# Patient Record
Sex: MALE | Race: OTHER | HISPANIC OR LATINO | URBAN - METROPOLITAN AREA
[De-identification: names, ages, dates, MRNs, and addresses within clinical notes are randomized per-mention and may not be internally consistent; named-entity substitution may affect disease eponyms.]

---

## 2021-12-23 ENCOUNTER — EMERGENCY (EMERGENCY)
Facility: HOSPITAL | Age: 71
LOS: 1 days | Discharge: HOME | End: 2021-12-23
Attending: EMERGENCY MEDICINE | Admitting: EMERGENCY MEDICINE
Payer: SELF-PAY

## 2021-12-23 VITALS
SYSTOLIC BLOOD PRESSURE: 202 MMHG | OXYGEN SATURATION: 97 % | DIASTOLIC BLOOD PRESSURE: 102 MMHG | TEMPERATURE: 98 F | HEART RATE: 84 BPM | RESPIRATION RATE: 16 BRPM

## 2021-12-23 VITALS
DIASTOLIC BLOOD PRESSURE: 85 MMHG | HEIGHT: 68 IN | SYSTOLIC BLOOD PRESSURE: 168 MMHG | HEART RATE: 107 BPM | RESPIRATION RATE: 12 BRPM | OXYGEN SATURATION: 94 % | WEIGHT: 110.01 LBS | TEMPERATURE: 96 F

## 2021-12-23 DIAGNOSIS — F19.19 OTHER PSYCHOACTIVE SUBSTANCE ABUSE WITH UNSPECIFIED PSYCHOACTIVE SUBSTANCE-INDUCED DISORDER: ICD-10-CM

## 2021-12-23 DIAGNOSIS — F17.200 NICOTINE DEPENDENCE, UNSPECIFIED, UNCOMPLICATED: ICD-10-CM

## 2021-12-23 LAB
AMPHET UR-MCNC: NEGATIVE — SIGNIFICANT CHANGE UP
ANION GAP SERPL CALC-SCNC: 13 MMOL/L — SIGNIFICANT CHANGE UP (ref 5–17)
APPEARANCE UR: CLEAR — SIGNIFICANT CHANGE UP
BARBITURATES UR SCN-MCNC: NEGATIVE — SIGNIFICANT CHANGE UP
BASE EXCESS BLDV CALC-SCNC: -2.5 MMOL/L — LOW (ref -2–3)
BASE EXCESS BLDV CALC-SCNC: -4.3 MMOL/L — LOW (ref -2–3)
BASOPHILS # BLD AUTO: 0.04 K/UL — SIGNIFICANT CHANGE UP (ref 0–0.2)
BASOPHILS NFR BLD AUTO: 0.3 % — SIGNIFICANT CHANGE UP (ref 0–2)
BENZODIAZ UR-MCNC: NEGATIVE — SIGNIFICANT CHANGE UP
BILIRUB UR-MCNC: NEGATIVE — SIGNIFICANT CHANGE UP
BUN SERPL-MCNC: 19 MG/DL — SIGNIFICANT CHANGE UP (ref 7–23)
CA-I SERPL-SCNC: 1.12 MMOL/L — LOW (ref 1.15–1.33)
CA-I SERPL-SCNC: 1.14 MMOL/L — LOW (ref 1.15–1.33)
CALCIUM SERPL-MCNC: 8.3 MG/DL — LOW (ref 8.4–10.5)
CHLORIDE SERPL-SCNC: 110 MMOL/L — HIGH (ref 96–108)
CO2 BLDV-SCNC: 27 MMOL/L — HIGH (ref 22–26)
CO2 BLDV-SCNC: 27.2 MMOL/L — HIGH (ref 22–26)
CO2 SERPL-SCNC: 23 MMOL/L — SIGNIFICANT CHANGE UP (ref 22–31)
COCAINE METAB.OTHER UR-MCNC: NEGATIVE — SIGNIFICANT CHANGE UP
COLOR SPEC: YELLOW — SIGNIFICANT CHANGE UP
CREAT SERPL-MCNC: 0.87 MG/DL — SIGNIFICANT CHANGE UP (ref 0.5–1.3)
DIFF PNL FLD: ABNORMAL
EOSINOPHIL # BLD AUTO: 0.01 K/UL — SIGNIFICANT CHANGE UP (ref 0–0.5)
EOSINOPHIL NFR BLD AUTO: 0.1 % — SIGNIFICANT CHANGE UP (ref 0–6)
EPI CELLS # UR: SIGNIFICANT CHANGE UP /HPF (ref 0–5)
ETHANOL SERPL-MCNC: 106 MG/DL — HIGH (ref 0–10)
GAS PNL BLDV: 143 MMOL/L — SIGNIFICANT CHANGE UP (ref 136–145)
GAS PNL BLDV: 144 MMOL/L — SIGNIFICANT CHANGE UP (ref 136–145)
GAS PNL BLDV: SIGNIFICANT CHANGE UP
GLUCOSE SERPL-MCNC: 115 MG/DL — HIGH (ref 70–99)
GLUCOSE UR QL: NEGATIVE — SIGNIFICANT CHANGE UP
HCO3 BLDV-SCNC: 25 MMOL/L — SIGNIFICANT CHANGE UP (ref 22–29)
HCO3 BLDV-SCNC: 25 MMOL/L — SIGNIFICANT CHANGE UP (ref 22–29)
HCT VFR BLD CALC: 41.5 % — SIGNIFICANT CHANGE UP (ref 39–50)
HGB BLD-MCNC: 13.5 G/DL — SIGNIFICANT CHANGE UP (ref 13–17)
IMM GRANULOCYTES NFR BLD AUTO: 0.5 % — SIGNIFICANT CHANGE UP (ref 0–1.5)
KETONES UR-MCNC: NEGATIVE — SIGNIFICANT CHANGE UP
LEUKOCYTE ESTERASE UR-ACNC: NEGATIVE — SIGNIFICANT CHANGE UP
LYMPHOCYTES # BLD AUTO: 1.13 K/UL — SIGNIFICANT CHANGE UP (ref 1–3.3)
LYMPHOCYTES # BLD AUTO: 9.7 % — LOW (ref 13–44)
MAGNESIUM SERPL-MCNC: 1.9 MG/DL — SIGNIFICANT CHANGE UP (ref 1.6–2.6)
MCHC RBC-ENTMCNC: 32.5 GM/DL — SIGNIFICANT CHANGE UP (ref 32–36)
MCHC RBC-ENTMCNC: 32.9 PG — SIGNIFICANT CHANGE UP (ref 27–34)
MCV RBC AUTO: 101.2 FL — HIGH (ref 80–100)
METHADONE UR-MCNC: NEGATIVE — SIGNIFICANT CHANGE UP
MONOCYTES # BLD AUTO: 0.82 K/UL — SIGNIFICANT CHANGE UP (ref 0–0.9)
MONOCYTES NFR BLD AUTO: 7 % — SIGNIFICANT CHANGE UP (ref 2–14)
NEUTROPHILS # BLD AUTO: 9.59 K/UL — HIGH (ref 1.8–7.4)
NEUTROPHILS NFR BLD AUTO: 82.4 % — HIGH (ref 43–77)
NITRITE UR-MCNC: NEGATIVE — SIGNIFICANT CHANGE UP
NRBC # BLD: 0 /100 WBCS — SIGNIFICANT CHANGE UP (ref 0–0)
OPIATES UR-MCNC: NEGATIVE — SIGNIFICANT CHANGE UP
PCO2 BLDV: 55 MMHG — SIGNIFICANT CHANGE UP (ref 42–55)
PCO2 BLDV: 66 MMHG — HIGH (ref 42–55)
PCP SPEC-MCNC: SIGNIFICANT CHANGE UP
PCP UR-MCNC: NEGATIVE — SIGNIFICANT CHANGE UP
PH BLDV: 7.19 — LOW (ref 7.32–7.43)
PH BLDV: 7.27 — LOW (ref 7.32–7.43)
PH UR: 6 — SIGNIFICANT CHANGE UP (ref 5–8)
PLATELET # BLD AUTO: 295 K/UL — SIGNIFICANT CHANGE UP (ref 150–400)
PO2 BLDV: 40 MMHG — SIGNIFICANT CHANGE UP (ref 25–45)
PO2 BLDV: 98 MMHG — HIGH (ref 25–45)
POTASSIUM BLDV-SCNC: 3.2 MMOL/L — LOW (ref 3.5–5.1)
POTASSIUM BLDV-SCNC: 3.3 MMOL/L — LOW (ref 3.5–5.1)
POTASSIUM SERPL-MCNC: 3.4 MMOL/L — LOW (ref 3.5–5.3)
POTASSIUM SERPL-SCNC: 3.4 MMOL/L — LOW (ref 3.5–5.3)
PROT UR-MCNC: 30 MG/DL
RBC # BLD: 4.1 M/UL — LOW (ref 4.2–5.8)
RBC # FLD: 13.4 % — SIGNIFICANT CHANGE UP (ref 10.3–14.5)
RBC CASTS # UR COMP ASSIST: < 5 /HPF — SIGNIFICANT CHANGE UP
SAO2 % BLDV: 68.4 % — SIGNIFICANT CHANGE UP (ref 67–88)
SAO2 % BLDV: 97.6 % — HIGH (ref 67–88)
SARS-COV-2 RNA SPEC QL NAA+PROBE: SIGNIFICANT CHANGE UP
SODIUM SERPL-SCNC: 146 MMOL/L — HIGH (ref 135–145)
SP GR SPEC: 1.02 — SIGNIFICANT CHANGE UP (ref 1–1.03)
THC UR QL: POSITIVE
UROBILINOGEN FLD QL: 0.2 E.U./DL — SIGNIFICANT CHANGE UP
WBC # BLD: 11.65 K/UL — HIGH (ref 3.8–10.5)
WBC # FLD AUTO: 11.65 K/UL — HIGH (ref 3.8–10.5)
WBC UR QL: < 5 /HPF — SIGNIFICANT CHANGE UP

## 2021-12-23 PROCEDURE — 93005 ELECTROCARDIOGRAM TRACING: CPT

## 2021-12-23 PROCEDURE — 96365 THER/PROPH/DIAG IV INF INIT: CPT

## 2021-12-23 PROCEDURE — 99291 CRITICAL CARE FIRST HOUR: CPT | Mod: 25

## 2021-12-23 PROCEDURE — 96375 TX/PRO/DX INJ NEW DRUG ADDON: CPT

## 2021-12-23 PROCEDURE — 99291 CRITICAL CARE FIRST HOUR: CPT

## 2021-12-23 PROCEDURE — 93010 ELECTROCARDIOGRAM REPORT: CPT

## 2021-12-23 PROCEDURE — 70450 CT HEAD/BRAIN W/O DYE: CPT | Mod: 26,MA

## 2021-12-23 PROCEDURE — 96376 TX/PRO/DX INJ SAME DRUG ADON: CPT

## 2021-12-23 RX ORDER — NALOXONE HYDROCHLORIDE 4 MG/.1ML
0.2 SPRAY NASAL ONCE
Refills: 0 | Status: COMPLETED | OUTPATIENT
Start: 2021-12-23 | End: 2021-12-23

## 2021-12-23 RX ORDER — SODIUM CHLORIDE 9 MG/ML
500 INJECTION INTRAMUSCULAR; INTRAVENOUS; SUBCUTANEOUS ONCE
Refills: 0 | Status: COMPLETED | OUTPATIENT
Start: 2021-12-23 | End: 2021-12-23

## 2021-12-23 RX ORDER — NALOXONE HYDROCHLORIDE 4 MG/.1ML
1 SPRAY NASAL ONCE
Refills: 0 | Status: COMPLETED | OUTPATIENT
Start: 2021-12-23 | End: 2021-12-23

## 2021-12-23 RX ORDER — NALOXONE HYDROCHLORIDE 4 MG/.1ML
0.4 SPRAY NASAL ONCE
Refills: 0 | Status: COMPLETED | OUTPATIENT
Start: 2021-12-23 | End: 2021-12-23

## 2021-12-23 RX ORDER — METOCLOPRAMIDE HCL 10 MG
10 TABLET ORAL ONCE
Refills: 0 | Status: COMPLETED | OUTPATIENT
Start: 2021-12-23 | End: 2021-12-23

## 2021-12-23 RX ADMIN — Medication 104 MILLIGRAM(S): at 19:43

## 2021-12-23 RX ADMIN — NALOXONE HYDROCHLORIDE 0.2 MILLIGRAM(S): 4 SPRAY NASAL at 14:24

## 2021-12-23 RX ADMIN — NALOXONE HYDROCHLORIDE 1 MILLIGRAM(S): 4 SPRAY NASAL at 15:38

## 2021-12-23 RX ADMIN — NALOXONE HYDROCHLORIDE 0.4 MILLIGRAM(S): 4 SPRAY NASAL at 14:25

## 2021-12-23 RX ADMIN — Medication 10 MILLIGRAM(S): at 20:45

## 2021-12-23 RX ADMIN — SODIUM CHLORIDE 1000 MILLILITER(S): 9 INJECTION INTRAMUSCULAR; INTRAVENOUS; SUBCUTANEOUS at 14:24

## 2021-12-23 NOTE — ED ADULT NURSE NOTE - OBJECTIVE STATEMENT
Patient BIBA for being found unresponsive in a [place]i nearby.  Patient was given Narcan 2 mg by EMS.  0.2 mg of Narcan given here in the ED.  Awake and responsive, minimally aggressive.  Continuous cardiac and EtCO2 monitoring in place.

## 2021-12-23 NOTE — ED PROVIDER NOTE - CLINICAL SUMMARY MEDICAL DECISION MAKING FREE TEXT BOX
Pt p/w presumed opiate OD. Pt w/ intermittent response to Narcan and moments of clear mental status, itching nose, yamning, and intermittently bradypneic. Suspect long-acting opiate. Close monitoring w/ cardiac / SP02 / end tidal CO2 monitor. Pt a/w alcohol bottles. Likely co-ingestants. Check labs, utox, monitor closely. In Narcan drip warranted, will consult MICU for admission Pt p/w presumed opiate OD. Pt w/ intermittent response to Narcan and moments of clear mental status, itching nose, yamning, and intermittently bradypneic. Suspect long-acting opiate. Close monitoring w/ cardiac / SP02 / end tidal CO2 monitor. Pt a/w alcohol bottles. Likely co-ingestants. Check labs, utox, monitor closely. In Narcan drip warranted, will consult MICU for admission. If not, monitor for sobriety

## 2021-12-23 NOTE — ED PROVIDER NOTE - PHYSICAL EXAMINATION
Constitutional: Thin, frail, lethargic but arousable to light physical stimuli, does not answer questioning  Head atraumatic, normocephalic  ENMT: Airway patent. Normal MM  Eyes: Clear bilaterally, pupils symmetric, pinpoint  Cardiac: Normal rate, regular rhythm.  Heart sounds S1, S2.  No murmurs, rubs or gallops.  Respiratory: Breaths sounds equal and clear b/l. No increased WOB, hypoxia, or accessory mm use. bradypneic w/ RR 6-8  Gastrointestinal: Abd soft, NT, ND, NABS. No guarding, rebound, or rigidity. No pulsatile abdominal masses.   Musculoskeletal: Range of motion is not limited  Neuro: does not respond to questioning, no facial droop, lethargic but arousable to light physical stimuli, BURNETT w/ good strength, grossly non focal  Skin: Skin normal color for race, warm, dry and intact. No evidence of rash.  Psych: unable to assess

## 2021-12-23 NOTE — ED PROVIDER NOTE - PATIENT PORTAL LINK FT
You can access the FollowMyHealth Patient Portal offered by [facility] by registering at the following website: [email or web address]. By joining [facility]’s FollowMyHealth portal, you will also be able to view your health information using other applications (apps) compatible with our system.

## 2021-12-23 NOTE — ED PROVIDER NOTE - OBJECTIVE STATEMENT
Pt w/ unknown PMHx BIBA for suspected opiate OD. Pt w/ unknown PMHx BIBA for suspected opiate OD. A/p triage, "Pt BIBA after being found in convenience store unresponsive RR 6. Pt responsive to 2mg IV narcan, Pt arousable but very drowsy w/o being stimulated. 94% Pox on 3L. Pt RR 12. 20g PIV in place. Pt also found to have vodka bottle at bedside. Pt disheveled appearing." Pt not responding to questioning on exam

## 2021-12-23 NOTE — ED PROVIDER NOTE - NSFOLLOWUPINSTRUCTIONS_ED_ALL_ED_FT
Your CT of the brain showed no acute abnormalities. Refrain from using drugs and drinking alcohol.       Polysubstance Abuse    WHAT YOU NEED TO KNOW:    Polysubstance abuse is the abuse of 2 or more drugs that cause impairment or distress. Examples include alcohol, nicotine, marijuana, cocaine, heroin, methamphetamine, hallucinogens such as mushrooms, or inhalants such as paint thinner. Prescribed medicines, such as opioids for pain or benzodiazepines for anxiety, are also commonly abused.    DISCHARGE INSTRUCTIONS:    Call your local emergency number (911 in the ) if:   •You feel you might harm yourself or others.          Return to the emergency department if:   •You have a seizure.      •You have chest pain and your heart is beating faster than usual.      •You have new shortness of breath.      •You are dizzy and lightheaded.      Call your doctor or therapist if:   •You are using drugs and think you are pregnant.      •You have withdrawal symptoms and want to start using drugs again.      •You have questions or concerns about your condition or care.      Risks of polysubstance abuse:   •Drug dependence is when you continue to use drugs, even when you know the risks. Polysubstance abuse can damage your heart, brain, lungs, liver, and gastrointestinal tract. You continue even when it causes problems with work, school, or relationships. You may have difficulty finding or keeping a job because of your drug dependence.      •Drug tolerance is when you need to use more drugs, or use them more often, to get the effects you want. You may not be able to stop using the drugs. When you try to stop, you may have withdrawal symptoms and strong cravings for the drugs.      •Drug overdose can occur when you take more drugs than your body can handle. This may be a small amount or a large amount. You can lose consciousness or have a seizure or stroke. Your heart can stop beating, or you can stop breathing. You may die from a drug overdose.      Medicines:   •Withdrawal medicines may be given according to the types of drugs you are abusing. Withdrawal from drugs can cause serious, life-threatening side effects. Certain medicines can help decrease your withdrawal symptoms and your desire for the drug. Ask for more information about the withdrawal medicines you may need.      •Mood stabilizers may be given to help prevent or treat depression or anxiety caused by drug abuse and withdrawal.      •Take your medicine as directed. Contact your healthcare provider if you think your medicine is not helping or if you have side effects. Tell him or her if you are allergic to any medicine. Keep a list of the medicines, vitamins, and herbs you take. Include the amounts, and when and why you take them. Bring the list or the pill bottles to follow-up visits. Carry your medicine list with you in case of an emergency.      Follow up with your doctor or therapist as directed: You may be referred to a specialist to treat health conditions caused by your drug use. This includes mental health, heart, or lung specialists. Write down your questions so you remember to ask them during your visits.    Therapy: You may need therapy and support to stop using drugs:   •Cognitive and behavioral therapy helps you change your thinking and behavior. It can help you develop plans to avoid the situations that make you want to use drugs. It also helps you cope with the feelings of wanting to use drugs. You may have individual or group therapy.      •Contingency management helps you set drug-free goals with a therapist. You will decide ways to celebrate your success when you reach a goal.      •Family therapy and support groups allow you and your family members to talk to and be encouraged by other people affected by drug abuse. You and your family members may attend together or separately. Ask your healthcare provider for information about programs in your area.      How polysubstance abuse affects unborn or  babies:   •If you are pregnant or get pregnant while using drugs, you may have a miscarriage or give birth early. Your baby may be born addicted to the drugs.      •Do not breastfeed your baby if you use drugs. Drugs pass from your bloodstream into your breast milk and affect your baby's health. Talk with your healthcare provider if you are using drugs and breastfeeding.      For support and more information:   •Alcoholics Anonymous  Web Address: http://www.aa.org      •Substance Abuse and Mental Health Services Administration  [address]  [place] [address]  Web Address: http://www.samhsa.gov

## 2021-12-23 NOTE — ED ADULT TRIAGE NOTE - CHIEF COMPLAINT QUOTE
Pt BIBA after being found in convenience store unresponsive RR 6. Pt responsive to 2mg IV narcan, Pt arousable but very drowsy w/o being stimulated. 94% Pox on 3L. Pt RR 12. 20g PIV in place. Pt also found to have vodka bottle at bedside. Pt disheveled appearing

## 2021-12-23 NOTE — ED ADULT NURSE REASSESSMENT NOTE - NS ED NURSE REASSESS COMMENT FT1
Patient arousable at this time after several doses of narcan IV.  Patient is verbally and physically aggressive.  Continuously ripping off EtCO2 monitor, Pulse Ox monitor, and EKG leads.  Patient restrained in soft two point restraints as per MD order.  Monitoring continues.

## 2021-12-23 NOTE — ED PROVIDER NOTE - PROGRESS NOTE DETAILS
Pt responds to Narcan, becomes awake, agitated, scratching nose, requesting water, but returns to resp depression. Will give additional dose Narcan, continue to monitor. When awake pt requesting water, alert, requesting life-saving equipment to be off Again, bradypneic to RR 6 after response to repeat Narcan, will give additional dose, continue to monitor Pt awake, alert to person, place, states he doesn't know what he took today. Opiates to heroin use. Denies opiate / fentanyl use. Denies other drug use. Vomited x 1. Will continue to monitor. CT performed as pt intermittently vomiting, can be 2/2 w/.d, however utox neg for opiates. ? synthetic, he told me earlier he sniffs heroin and did not know what he took. CTH negative. Pt ambulating in the ED. Pt not cooperative. Pt frequently getting OOB, states he does not feel well, does not want to answer questions. Spoken to in both English and Czech, although understands and speaks English well when willing. He is not cooperative w/ hx taking. HE denies hx HTN. He does not want to take anything. He wants to go home. He states he has a home. SW has seen pt. Pt ambulates w/ steady gait Pt awake, alert to person, place, states he doesn't know what he took today. Admits to heroin use. Denies other opiate / fentanyl use. Denies other drug use. Vomited x 1. Will continue to monitor. Pt responds to Narcan, becomes awake, agitated, scratching nose, requesting water, but returns to resp depression. Will give additional dose Narcan, continue to monitor. When awake pt requesting water, alert, pulling off life-saving equipment/monitoring, prompting restraints CT performed as pt intermittently vomiting, still sedated, although w/ much improved MS. Vomiting likely 2/2 w/d, however utox neg for opiates. ? synthetic use, other polysubstance abuse. he reported earlier he sniffs heroin and did not know what he took. CTH negative. Pt ambulating in the ED. Pt not cooperative. Pt frequently getting OOB, states he does not feel well, but also does not want to answer questions. Spoken to in both English and Andorran, although understands and speaks English well when willing to respond. He is not cooperative w/ hx taking. HE denies hx HTN. He does not want to take anything. He wants to go home. He states he has a home. SW has seen pt. Pt ambulates w/ steady gait